# Patient Record
Sex: MALE | Race: BLACK OR AFRICAN AMERICAN | Employment: FULL TIME | URBAN - METROPOLITAN AREA
[De-identification: names, ages, dates, MRNs, and addresses within clinical notes are randomized per-mention and may not be internally consistent; named-entity substitution may affect disease eponyms.]

---

## 2023-03-04 ENCOUNTER — HOSPITAL ENCOUNTER (EMERGENCY)
Age: 58
Discharge: HOME | End: 2023-03-04
Attending: STUDENT IN AN ORGANIZED HEALTH CARE EDUCATION/TRAINING PROGRAM
Payer: OTHER GOVERNMENT

## 2023-03-04 ENCOUNTER — APPOINTMENT (OUTPATIENT)
Dept: GENERAL RADIOLOGY | Age: 58
End: 2023-03-04
Attending: STUDENT IN AN ORGANIZED HEALTH CARE EDUCATION/TRAINING PROGRAM
Payer: OTHER GOVERNMENT

## 2023-03-04 ENCOUNTER — APPOINTMENT (OUTPATIENT)
Dept: CT IMAGING | Age: 58
End: 2023-03-04
Attending: STUDENT IN AN ORGANIZED HEALTH CARE EDUCATION/TRAINING PROGRAM
Payer: OTHER GOVERNMENT

## 2023-03-04 VITALS
RESPIRATION RATE: 12 BRPM | OXYGEN SATURATION: 97 % | DIASTOLIC BLOOD PRESSURE: 89 MMHG | WEIGHT: 225.09 LBS | HEART RATE: 61 BPM | HEIGHT: 74 IN | BODY MASS INDEX: 28.89 KG/M2 | SYSTOLIC BLOOD PRESSURE: 143 MMHG | TEMPERATURE: 98.2 F

## 2023-03-04 DIAGNOSIS — R07.9 CHEST PAIN, UNSPECIFIED TYPE: Primary | ICD-10-CM

## 2023-03-04 LAB
ALBUMIN SERPL-MCNC: 4 G/DL (ref 3.5–5)
ALBUMIN/GLOB SERPL: 1.1 (ref 1.1–2.2)
ALP SERPL-CCNC: 68 U/L (ref 45–117)
ALT SERPL-CCNC: 27 U/L (ref 12–78)
ANION GAP SERPL CALC-SCNC: 4 MMOL/L (ref 5–15)
AST SERPL-CCNC: 17 U/L (ref 15–37)
BASOPHILS # BLD: 0 K/UL (ref 0–0.1)
BASOPHILS NFR BLD: 1 % (ref 0–1)
BILIRUB SERPL-MCNC: 0.8 MG/DL (ref 0.2–1)
BNP SERPL-MCNC: 14 PG/ML
BUN SERPL-MCNC: 19 MG/DL (ref 6–20)
BUN/CREAT SERPL: 21 (ref 12–20)
CALCIUM SERPL-MCNC: 9.1 MG/DL (ref 8.5–10.1)
CHLORIDE SERPL-SCNC: 106 MMOL/L (ref 97–108)
CO2 SERPL-SCNC: 27 MMOL/L (ref 21–32)
CREAT SERPL-MCNC: 0.92 MG/DL (ref 0.7–1.3)
D DIMER PPP FEU-MCNC: 1.17 MG/L FEU (ref 0–0.65)
DIFFERENTIAL METHOD BLD: NORMAL
EOSINOPHIL # BLD: 0.2 K/UL (ref 0–0.4)
EOSINOPHIL NFR BLD: 3 % (ref 0–7)
ERYTHROCYTE [DISTWIDTH] IN BLOOD BY AUTOMATED COUNT: 13.1 % (ref 11.5–14.5)
GLOBULIN SER CALC-MCNC: 3.7 G/DL (ref 2–4)
GLUCOSE SERPL-MCNC: 89 MG/DL (ref 65–100)
HCT VFR BLD AUTO: 43.6 % (ref 36.6–50.3)
HGB BLD-MCNC: 13.9 G/DL (ref 12.1–17)
IMM GRANULOCYTES # BLD AUTO: 0 K/UL (ref 0–0.04)
IMM GRANULOCYTES NFR BLD AUTO: 0 % (ref 0–0.5)
LYMPHOCYTES # BLD: 2.8 K/UL (ref 0.8–3.5)
LYMPHOCYTES NFR BLD: 34 % (ref 12–49)
MCH RBC QN AUTO: 27.5 PG (ref 26–34)
MCHC RBC AUTO-ENTMCNC: 31.9 G/DL (ref 30–36.5)
MCV RBC AUTO: 86.2 FL (ref 80–99)
MONOCYTES # BLD: 0.6 K/UL (ref 0–1)
MONOCYTES NFR BLD: 7 % (ref 5–13)
NEUTS SEG # BLD: 4.5 K/UL (ref 1.8–8)
NEUTS SEG NFR BLD: 55 % (ref 32–75)
NRBC # BLD: 0 K/UL (ref 0–0.01)
NRBC BLD-RTO: 0 PER 100 WBC
PLATELET # BLD AUTO: 280 K/UL (ref 150–400)
PMV BLD AUTO: 9.1 FL (ref 8.9–12.9)
POTASSIUM SERPL-SCNC: 3.8 MMOL/L (ref 3.5–5.1)
PROT SERPL-MCNC: 7.7 G/DL (ref 6.4–8.2)
RBC # BLD AUTO: 5.06 M/UL (ref 4.1–5.7)
SODIUM SERPL-SCNC: 137 MMOL/L (ref 136–145)
TROPONIN I SERPL HS-MCNC: 4 NG/L (ref 0–76)
TROPONIN I SERPL HS-MCNC: <4 NG/L (ref 0–76)
WBC # BLD AUTO: 8.2 K/UL (ref 4.1–11.1)

## 2023-03-04 PROCEDURE — 36415 COLL VENOUS BLD VENIPUNCTURE: CPT

## 2023-03-04 PROCEDURE — 83880 ASSAY OF NATRIURETIC PEPTIDE: CPT

## 2023-03-04 PROCEDURE — 85025 COMPLETE CBC W/AUTO DIFF WBC: CPT

## 2023-03-04 PROCEDURE — 80053 COMPREHEN METABOLIC PANEL: CPT

## 2023-03-04 PROCEDURE — 85379 FIBRIN DEGRADATION QUANT: CPT

## 2023-03-04 PROCEDURE — 71275 CT ANGIOGRAPHY CHEST: CPT

## 2023-03-04 PROCEDURE — 93005 ELECTROCARDIOGRAM TRACING: CPT

## 2023-03-04 PROCEDURE — 99285 EMERGENCY DEPT VISIT HI MDM: CPT

## 2023-03-04 PROCEDURE — 74011000636 HC RX REV CODE- 636: Performed by: STUDENT IN AN ORGANIZED HEALTH CARE EDUCATION/TRAINING PROGRAM

## 2023-03-04 PROCEDURE — 84484 ASSAY OF TROPONIN QUANT: CPT

## 2023-03-04 PROCEDURE — 74011250637 HC RX REV CODE- 250/637: Performed by: STUDENT IN AN ORGANIZED HEALTH CARE EDUCATION/TRAINING PROGRAM

## 2023-03-04 PROCEDURE — 71045 X-RAY EXAM CHEST 1 VIEW: CPT

## 2023-03-04 RX ORDER — ASPIRIN 325 MG
325 TABLET ORAL
Status: COMPLETED | OUTPATIENT
Start: 2023-03-04 | End: 2023-03-04

## 2023-03-04 RX ADMIN — IOPAMIDOL 100 ML: 755 INJECTION, SOLUTION INTRAVENOUS at 19:52

## 2023-03-04 RX ADMIN — ASPIRIN 325 MG: 325 TABLET ORAL at 17:42

## 2023-03-04 NOTE — Clinical Note
[address]  [facility] EMERGENCY DEPT  [address] [id number] [phone]    Work/School Note    Date: 3/4/2023    To Whom It May concern: Dennis Dasilav was seen and treated today in the emergency room by the following provider(s):  Attending Provider: Sukh Parra MD.      Dennis Dasilva is excused from work/school on 03/04/23 and 03/05/23. He is medically clear to return to work/school on 3/6/2023.        Sincerely,          Audra Jaramillo MD

## 2023-03-04 NOTE — ED NOTES
Patient arrives with chief complaint of chest pain and 5/10 left upper arm pain that began this morning. Denies shortness of breath.

## 2023-03-05 LAB
ATRIAL RATE: 60 BPM
CALCULATED P AXIS, ECG09: 59 DEGREES
CALCULATED R AXIS, ECG10: 64 DEGREES
CALCULATED T AXIS, ECG11: 33 DEGREES
DIAGNOSIS, 93000: NORMAL
P-R INTERVAL, ECG05: 158 MS
Q-T INTERVAL, ECG07: 408 MS
QRS DURATION, ECG06: 88 MS
QTC CALCULATION (BEZET), ECG08: 408 MS
VENTRICULAR RATE, ECG03: 60 BPM

## 2023-03-05 NOTE — ED PROVIDER NOTES
EMERGENCY DEPARTMENT HISTORY AND PHYSICAL EXAM      Date: 3/4/2023  Patient Name: Dennis Dasilva    History of Presenting Illness     Chief Complaint   Patient presents with    Chest Pain     History Provided By: Patient    HPI: Dennis Dasilva, 61 y.o. male with a past medical history significant for medical problems as stated below presents to the ED with cc of chest pain. He reports that chest pain is midsternal and radiates towards his jaw into his left arm. He states that this occurred for about 4 hours prior to arrival.  It happened at rest some had no exertional component. He reports that this happened before, but in the past and has resolved on its own. When it did not resolve quickly decided to come to the emergency department. He took some Goody's powder with no resolution of his pain. He denies any medical problems, no smoking, no family hx of heart disease. No associated symptoms. On my initial interview he reported that the chest pain was almost completely resolved. PCP: Milan Boateng MD    No current facility-administered medications on file prior to encounter. No current outpatient medications on file prior to encounter. Past History     Past Medical History:  No past medical history on file. Past Surgical History:  No past surgical history on file. Family History:  No family history on file. Social History:        Allergies:  No Known Allergies      Review of Systems   Review of Systems  Per HPI    Physical Exam   Physical Exam  Vital signs reviewed and documented in EMR  Nontoxic and well-appearing  No acute distress  No tachycardia  No chest wall deformity, no reproducible chest wall tenderness  CTAB, no incr wob  Abdomen nondistended, soft, nontender  No focal motor deficits  No peripheral edema    Diagnostic Study Results     Labs -     Recent Results (from the past 24 hour(s))   EKG, 12 LEAD, INITIAL    Collection Time: 03/04/23  4:19 PM   Result Value Ref Range Ventricular Rate 60 BPM    Atrial Rate 60 BPM    P-R Interval 158 ms    QRS Duration 88 ms    Q-T Interval 408 ms    QTC Calculation (Bezet) 408 ms    Calculated P Axis 59 degrees    Calculated R Axis 64 degrees    Calculated T Axis 33 degrees    Diagnosis       Normal sinus rhythm  Normal ECG  No previous ECGs available     CBC WITH AUTOMATED DIFF    Collection Time: 03/04/23  4:24 PM   Result Value Ref Range    WBC 8.2 4.1 - 11.1 K/uL    RBC 5.06 4.10 - 5.70 M/uL    HGB 13.9 12.1 - 17.0 g/dL    HCT 43.6 36.6 - 50.3 %    MCV 86.2 80.0 - 99.0 FL    MCH 27.5 26.0 - 34.0 PG    MCHC 31.9 30.0 - 36.5 g/dL    RDW 13.1 11.5 - 14.5 %    PLATELET 559 696 - 373 K/uL    MPV 9.1 8.9 - 12.9 FL    NRBC 0.0 0  WBC    ABSOLUTE NRBC 0.00 0.00 - 0.01 K/uL    NEUTROPHILS 55 32 - 75 %    LYMPHOCYTES 34 12 - 49 %    MONOCYTES 7 5 - 13 %    EOSINOPHILS 3 0 - 7 %    BASOPHILS 1 0 - 1 %    IMMATURE GRANULOCYTES 0 0.0 - 0.5 %    ABS. NEUTROPHILS 4.5 1.8 - 8.0 K/UL    ABS. LYMPHOCYTES 2.8 0.8 - 3.5 K/UL    ABS. MONOCYTES 0.6 0.0 - 1.0 K/UL    ABS. EOSINOPHILS 0.2 0.0 - 0.4 K/UL    ABS. BASOPHILS 0.0 0.0 - 0.1 K/UL    ABS. IMM. GRANS. 0.0 0.00 - 0.04 K/UL    DF AUTOMATED     METABOLIC PANEL, COMPREHENSIVE    Collection Time: 03/04/23  4:24 PM   Result Value Ref Range    Sodium 137 136 - 145 mmol/L    Potassium 3.8 3.5 - 5.1 mmol/L    Chloride 106 97 - 108 mmol/L    CO2 27 21 - 32 mmol/L    Anion gap 4 (L) 5 - 15 mmol/L    Glucose 89 65 - 100 mg/dL    BUN 19 6 - 20 MG/DL    Creatinine 0.92 0.70 - 1.30 MG/DL    BUN/Creatinine ratio 21 (H) 12 - 20      eGFR >60 >60 ml/min/1.73m2    Calcium 9.1 8.5 - 10.1 MG/DL    Bilirubin, total 0.8 0.2 - 1.0 MG/DL    ALT (SGPT) 27 12 - 78 U/L    AST (SGOT) 17 15 - 37 U/L    Alk.  phosphatase 68 45 - 117 U/L    Protein, total 7.7 6.4 - 8.2 g/dL    Albumin 4.0 3.5 - 5.0 g/dL    Globulin 3.7 2.0 - 4.0 g/dL    A-G Ratio 1.1 1.1 - 2.2     NT-PRO BNP    Collection Time: 03/04/23  4:24 PM   Result Value Ref Range    NT pro-BNP 14 <125 PG/ML   TROPONIN-HIGH SENSITIVITY    Collection Time: 03/04/23  4:24 PM   Result Value Ref Range    Troponin-High Sensitivity 4 0 - 76 ng/L   D DIMER    Collection Time: 03/04/23  5:42 PM   Result Value Ref Range    D-dimer 1.17 (H) 0.00 - 0.65 mg/L FEU   TROPONIN-HIGH SENSITIVITY    Collection Time: 03/04/23  6:12 PM   Result Value Ref Range    Troponin-High Sensitivity <4 0 - 76 ng/L       Radiologic Studies -   CTA CHEST W OR W WO CONT   Final Result   No pulmonary embolism or acute airspace disease. XR CHEST PORT   Final Result   No acute process. CT Results  (Last 48 hours)                 03/04/23 1952  CTA CHEST W OR W WO CONT Final result    Impression:  No pulmonary embolism or acute airspace disease. Narrative:  EXAM:  CTA CHEST W OR W WO CONT       INDICATION: Eval PE, elevated d-dimer       COMPARISON: None. TECHNIQUE: Helical thin section chest CT following uneventful intravenous   administration of nonionic contrast 100 mL of isovue 370 according to   departmental PE protocol. Coronal and sagittal reformats were performed. 3D post   processing was performed. CT dose reduction was achieved through the use of a   standardized protocol tailored for this examination and automatic exposure   control for dose modulation. FINDINGS: This is a good quality study for the evaluation of pulmonary embolism   to the first subsegmental arterial level. There is no pulmonary embolism to this   level. THYROID: No nodule. MEDIASTINUM: No mass or lymphadenopathy. RODNEY: No mass or lymphadenopathy. THORACIC AORTA: No aneurysm. HEART: Normal in size. ESOPHAGUS: No wall thickening or dilatation. TRACHEA/BRONCHI: Patent. PLEURA: No effusion or pneumothorax. LUNGS: No nodule, mass, or airspace disease. UPPER ABDOMEN: Partially imaged. No acute pathology. BONES: No aggressive bone lesion or fracture.                  CXR Results  (Last 48 hours)                 03/04/23 1637  XR CHEST PORT Final result    Impression:  No acute process. Narrative:  INDICATION: chest pain         EXAM:  AP CHEST RADIOGRAPH       COMPARISON: None       FINDINGS:       AP portable view of the chest demonstrates a normal cardiomediastinal   silhouette. There is no edema, effusion, consolidation, or pneumothorax. The   osseous structures are unremarkable. Medical Decision Making   I am the first provider for this patient. I reviewed the vital signs, available nursing notes, past medical history, past surgical history, family history and social history. Vital Signs-Reviewed the patient's vital signs. Patient Vitals for the past 12 hrs:   Temp Pulse Resp BP SpO2   03/04/23 2015 -- 61 12 -- 97 %   03/04/23 2000 -- -- -- (!) 143/89 --   03/04/23 1930 -- 61 12 (!) 142/95 99 %   03/04/23 1920 -- 63 20 -- 97 %   03/04/23 1915 -- 63 16 (!) 132/91 96 %   03/04/23 1857 -- 71 14 135/76 100 %   03/04/23 1842 -- 67 13 137/89 98 %   03/04/23 1830 -- 63 15 134/79 97 %   03/04/23 1812 -- 62 20 132/68 97 %   03/04/23 1800 -- 65 15 126/78 97 %   03/04/23 1724 -- 64 17 133/83 97 %   03/04/23 1654 -- 62 12 127/85 97 %   03/04/23 1639 -- 70 17 130/85 97 %   03/04/23 1638 -- 61 16 130/85 98 %   03/04/23 1615 98.2 °F (36.8 °C) (!) 59 16 (!) 140/96 98 %       Records Reviewed: Nursing records and medical records reviewed    MDM:    Medical Decision Making  Patient presented the emergency department with chest pain. Is a moderately suspicious story, age of 61, but the pain and fully resolved prior to arrival.  He is given a full dose of aspirin here in the emergency department. Serial troponins were negative. D-dimer was obtained as patient is at low risk for VTE and was found to be slightly elevated. CTA was neagtive for PE or dissection though. Patient remained chest pain free for his entire stay in the ED.   Considered admission, but he has a low risk Heart score and wanted to go home - we discussed the underlying risk of MACE of 1-2% in the next 30 days and need for cardiology followup. We discussed strict return precautions and the importance of followup. He voiced understanding. Amount and/or Complexity of Data Reviewed  Labs: ordered. Decision-making details documented in ED Course. Radiology: ordered and independent interpretation performed. Decision-making details documented in ED Course. Details: CXR as interpreted by me with no consolidation, no cardiomegaly, no widened mediastinum  ECG/medicine tests: ordered and independent interpretation performed. Decision-making details documented in ED Course. Risk  OTC drugs. Prescription drug management. ED Course:   Initial assessment performed. The patients presenting problems have been discussed, and they are in agreement with the care plan formulated and outlined with them. I have encouraged them to ask questions as they arise throughout their visit. ED Course as of 03/05/23 0044   Sat Mar 04, 2023   1646 EKG as interpreted by me with sinus rhythm, heart rate 60, normal axis, normal intervals, no ST changes [WB]   2023 CTA with no pulmonary embolism. Patient's work-up here has remained negative. This point he is stable for discharge home. [WB]      ED Course User Index  [WB] Clementine Lanza MD       Medications Administered       aspirin tablet 325 mg       Admin Date  03/04/2023 Action  Given Dose  325 mg Route  Oral Administered By  Pankaj Lowe RN              iopamidoL (ISOVUE-370) 370 mg iodine /mL (76 %) injection 100 mL       Admin Date  03/04/2023 Action  Given Dose  100 mL Route  IntraVENous Administered By  Caio Cooper                  Critical Care:  None    Disposition:  Home    DISCHARGE PLAN:  1. There are no discharge medications for this patient.     2.   Follow-up Information       Follow up With Specialties Details Why Monica Potts MD [facility] Medicine Schedule an appointment as soon as possible for a visit   [facility]  [phone]      [facility] EMERGENCY DEPT Emergency Medicine  If symptoms worsen, As needed [address]   [address] [phone] [facility]  Schedule an appointment as soon as possible for a visit  [phone] Right [address]  [address] [facility]  Schedule an appointment as soon as possible for a visit  [phone] Right [address]          3. Return to ED if worse     Diagnosis     Clinical Impression:   1. Chest pain, unspecified type        Attestations:    Delphina Gottron, MD    Please note that this dictation was completed with [employer], the computer voice recognition software. Quite often unanticipated grammatical, syntax, homophones, and other interpretive errors are inadvertently transcribed by the computer software. Please disregard these errors. Please excuse any errors that have escaped final proofreading. Thank you.

## 2023-03-05 NOTE — ED NOTES
Verbal shift change report given to Lionel Parra (oncoming nurse) by Figueroa Reyes (offgoing nurse). Report included the following information SBAR, Kardex, ED Summary, and MAR.